# Patient Record
Sex: MALE | Race: WHITE | Employment: PART TIME | URBAN - METROPOLITAN AREA
[De-identification: names, ages, dates, MRNs, and addresses within clinical notes are randomized per-mention and may not be internally consistent; named-entity substitution may affect disease eponyms.]

---

## 2018-08-24 ENCOUNTER — OFFICE VISIT (OUTPATIENT)
Dept: URGENT CARE | Facility: URGENT CARE | Age: 18
End: 2018-08-24

## 2018-08-24 ENCOUNTER — RADIANT APPOINTMENT (OUTPATIENT)
Dept: GENERAL RADIOLOGY | Facility: CLINIC | Age: 18
End: 2018-08-24
Attending: FAMILY MEDICINE

## 2018-08-24 VITALS
TEMPERATURE: 98.5 F | WEIGHT: 280 LBS | DIASTOLIC BLOOD PRESSURE: 64 MMHG | OXYGEN SATURATION: 96 % | SYSTOLIC BLOOD PRESSURE: 122 MMHG | HEART RATE: 75 BPM

## 2018-08-24 DIAGNOSIS — S60.032A CONTUSION OF LEFT MIDDLE FINGER WITHOUT DAMAGE TO NAIL, INITIAL ENCOUNTER: Primary | ICD-10-CM

## 2018-08-24 DIAGNOSIS — M79.645 PAIN OF FINGER OF LEFT HAND: ICD-10-CM

## 2018-08-24 PROCEDURE — 73140 X-RAY EXAM OF FINGER(S): CPT | Mod: LT

## 2018-08-24 PROCEDURE — 99203 OFFICE O/P NEW LOW 30 MIN: CPT | Performed by: FAMILY MEDICINE

## 2018-08-24 RX ORDER — ALBUTEROL SULFATE 90 UG/1
2 AEROSOL, METERED RESPIRATORY (INHALATION)
COMMUNITY
Start: 2018-03-26

## 2018-08-24 RX ORDER — MONTELUKAST SODIUM 10 MG/1
10 TABLET ORAL
COMMUNITY
Start: 2018-03-26 | End: 2019-06-19

## 2018-08-24 NOTE — LETTER
[facility]  [address]  [address]  [facility] [address]  [phone]        2018    REPORT OF WORK ABILITY    PATIENT DATA  Employee Name: Murali Mcconnell        : 2000   Work related injury: YES  Today's date: 2018  Date of injury: 2018    PROVIDER EVALUATION: Please fill in as needed.  Please give copy to employee for employer.  1. Diagnosis: Left Third Finger Pain, Contusion of the Left Third Finger  2. Treatment: Wear a finger splint for 2 weeks.  Place ice onto the painful areas of the left middle finger for 10-15 minutes at a time, every 2-3 hours while awake.  Ibuprofen for the pain.    3. Medication: Rx:  Tylenol #3. Take one tablet by mouth every 6 hours as needed for severe pain.    NOTE: When ordering a medication, [place] Rules require Work Comp or WC on prescriptions.  4. Return to work date: Monday, 2018, with the following: No use of the left middle finger while at work for 10 days.   DURATION OF LIMITATIONS: from 2018, to 2018.       RESTRICTIONS: Unlimited unless listed.  Restrictions apply to home and leisure also.  If work within restrictions is not available, the employee is totally disabled.  Provider comments: follow up with the primary care provider if not better in 2 weeks.    Medical Examiner: Kamar Landers MD      License or registration: [place] [id number]    Next appointment: in 2 weeks as needed with the primary care provider if not better.     CC: Employer, Managed Care Plan/Payor, Patient

## 2018-08-24 NOTE — MR AVS SNAPSHOT
"              After Visit Summary   8/24/2018    Murali Mcconnell    MRN: [id number]           Patient Information     Date Of Birth          2000        Visit Information        Provider Department      8/24/2018 3:00 PM Kamar Landers MD [facility]        Today's Diagnoses     Contusion of left middle finger without damage to nail, initial encounter    -  1    Pain of finger of left hand          Care Instructions    Place ice onto the painful swollen areas of the left middle finger for 10-15 minutes at a time, every 2-3 hours while awake.      Ibuprofen for the pain.      Wear the finger splint for 2 weeks.     follow up with your primary care provider if not better in 2 weeks.      No use of the left middle finger for the next 10 days.                Follow-ups after your visit        Who to contact     If you have questions or need follow up information about today's clinic visit or your schedule please contact [facility] directly at [phone].  Normal or non-critical lab and imaging results will be communicated to you by [employer], letter or phone within 4 business days after the clinic has received the results. If you do not hear from us within 7 days, please contact the clinic through [employer] or phone. If you have a critical or abnormal lab result, we will notify you by phone as soon as possible.  Submit refill requests through [employer] or call your pharmacy and they will forward the refill request to us. Please allow 3 business days for your refill to be completed.          Additional Information About Your Visit        [employer] Information     [employer] lets you send messages to your doctor, view your test results, renew your prescriptions, schedule appointments and more. To sign up, go to [email or web address] . Click on \"Log in\" on the left side of the screen, which will take you to the Welcome page. Then click on \"Sign up Now\" on the right side of the page.     You will be " asked to enter the access code listed below, as well as some personal information. Please follow the directions to create your username and password.     Your access code is: [email or web address]  Expires: 2018  4:11 PM     Your access code will  in 90 days. If you need help or a new code, please call your [facility] clinic or [phone].        Care EveryWhere ID     This is your Care EveryWhere ID. This could be used by other organizations to access your [facility] medical records  [id number]        Your Vitals Were     Pulse Temperature Pulse Oximetry             75 98.5  F (36.9  C) (Tympanic) 96%          Blood Pressure from Last 3 Encounters:   18 122/64    Weight from Last 3 Encounters:   18 280 lb (127 kg)              We Performed the Following     FOAM FINGER SPLINT L     XR Finger Left G/E 2 Views          Today's Medication Changes          These changes are accurate as of 18  4:11 PM.  If you have any questions, ask your nurse or doctor.               Start taking these medicines.        Dose/Directions    acetaminophen-codeine 300-30 MG per tablet   Commonly known as:  TYLENOL #3   Used for:  Pain of finger of left hand   Started by:  Kamar Landers MD        Dose:  1 tablet   Take 1 tablet by mouth every 6 hours as needed for severe pain   Quantity:  12 tablet   Refills:  0            Where to get your medicines      Some of these will need a paper prescription and others can be bought over the counter.  Ask your nurse if you have questions.     Bring a paper prescription for each of these medications     acetaminophen-codeine 300-30 MG per tablet               Information about OPIOIDS     PRESCRIPTION OPIOIDS: WHAT YOU NEED TO KNOW   We gave you an opioid (narcotic) pain medicine. It is important to manage your pain, but opioids are not always the best choice. You should first try all the other options your care team gave you. Take this medicine for as short a time (and as few  doses) as possible.    Some activities can increase your pain, such as bandage changes or therapy sessions. It may help to take your pain medicine 30 to 60 minutes before these activities. Reduce your stress by getting enough sleep, working on hobbies you enjoy and practicing relaxation or meditation. Talk to your care team about ways to manage your pain beyond prescription opioids.    These medicines have risks:    DO NOT drive when on new or higher doses of pain medicine. These medicines can affect your alertness and reaction times, and you could be arrested for driving under the influence (DUI). If you need to use opioids long-term, talk to your care team about driving.    DO NOT operate heavy machinery    DO NOT do any other dangerous activities while taking these medicines.    DO NOT drink any alcohol while taking these medicines.     If the opioid prescribed includes acetaminophen, DO NOT take with any other medicines that contain acetaminophen. Read all labels carefully. Look for the word  acetaminophen  or  Tylenol.  Ask your pharmacist if you have questions or are unsure.    You can get addicted to pain medicines, especially if you have a history of addiction (chemical, alcohol or substance dependence). Talk to your care team about ways to reduce this risk.    All opioids tend to cause constipation. Drink plenty of water and eat foods that have a lot of fiber, such as fruits, vegetables, prune juice, apple juice and high-fiber cereal. Take a laxative (Miralax, milk of magnesia, Colace, Senna) if you don t move your bowels at least every other day. Other side effects include upset stomach, sleepiness, dizziness, throwing up, tolerance (needing more of the medicine to have the same effect), physical dependence and slowed breathing.    Store your pills in a secure place, locked if possible. We will not replace any lost or stolen medicine. If you don t finish your medicine, please throw away (dispose) as  directed by your pharmacist. The [place] Pollution Control Agency has more information about safe disposal: https://www.pca.[facility].[place].us/living-green/managing-unwanted-medications         Primary Care Provider Office Phone # Fax #    [facility] [phone] [address]  [place] [address]        Equal Access to Services     WALKER SALDANA : Hadii aad ku hadasho Soomaali, waaxda luqadaha, qaybta kaalmada adeegyada, waxay idiin hayaan adeeg lorenzoxiomyjuani nassar . So [facility] [phone].    ATENCIÓN: Si habla español, tiene a joya disposición servicios gratuitos de asistencia lingüística. Juan al [phone].    We comply with applicable federal civil rights laws and [place] laws. We do not discriminate on the basis of race, color, national origin, age, disability, sex, sexual orientation, or gender identity.            Thank you!     Thank you for choosing [facility]  for your care. Our goal is always to provide you with excellent care. Hearing back from our patients is one way we can continue to improve our services. Please take a few minutes to complete the written survey that you may receive in the mail after your visit with us. Thank you!             Your Updated Medication List - Protect others around you: Learn how to safely use, store and throw away your medicines at www.disposemymeds.org.          This list is accurate as of 8/24/18  4:11 PM.  Always use your most recent med list.                   Brand Name Dispense Instructions for use Diagnosis    acetaminophen-codeine 300-30 MG per tablet    TYLENOL #3    12 tablet    Take 1 tablet by mouth every 6 hours as needed for severe pain    Pain of finger of left hand       albuterol 108 (90 Base) MCG/ACT inhaler    PROAIR HFA/PROVENTIL HFA/VENTOLIN HFA     Inhale 2 puffs into the lungs        montelukast 10 MG tablet    SINGULAIR     Take 10 mg by mouth

## 2018-08-24 NOTE — PROGRESS NOTES
THIS IS A WORK COMP ENCOUNTER    SUBJECTIVE:  Chief Complaint   Patient presents with     Urgent Care     Work Comp     Middle finger injury from being caught at work x 10:30-10:45 yesterday night      Murali Mcconnell is a 18 year old right-handed male presents with a chief complaint of pain at the distal half of the left middle finger.     The injury occurred yesterday at around 10:30-10:45 pm  The injury happened while at work. 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  His left middle finger has produced two blood blisters, swelling, and bruising under the middle fingernail.  The patient complained of moderate-severe pain (pain ratin out of 10)  and has had decreased ROM.  Pain exacerbated by using the left third finger.  In addition, keeping the left finger below the level of the heart worsens the pain.  .  Relieved by elevating the left middle finger, by placing ice..  He treated it initially with ice.. This is the first time this type of injury has occurred to this patient.     Past Medical History:    Asthma      Current Outpatient Prescriptions   Medication Sig Dispense Refill     albuterol (PROAIR HFA/PROVENTIL HFA/VENTOLIN HFA) 108 (90 Base) MCG/ACT inhaler Inhale 2 puffs into the lungs       montelukast (SINGULAIR) 10 MG tablet Take 10 mg by mouth       Social History   Substance Use Topics     Smoking status: Not on file     Smokeless tobacco: Not on file     Alcohol use Not on file       ROS:  Review of systems negative except as stated above.    EXAM:   /64 (BP Location: Right arm, Patient Position: Chair, Cuff Size: Adult Large)  Pulse 75  Temp 98.5  F (36.9  C) (Tympanic)  Wt 280 lb (127 kg)  SpO2 96%  Gen: healthy,alert,no distress  Extremity: Left third finger has a subungual hematoma present (mild) and two small isolated areas of ecchymosis on the volar surface of the distal phalanx area.   The fingernail is intact.  No avulsions of the nail.    There is swelling and a lot of pain with palpation over the distal phalanx region.  .   There is not compromise to the distal circulation. No obvious lacerations/abrasions.    NEURO: Normal strength and tone, sensory exam grossly normal, mentation intact and speech normal    X-RAY was done.  X-rays of the left third finger:  No acute fractures nor dislocations.     ASSESSMENT:   Pain at the left third finger  Contusion of the left third finger.     PLAN:  1) Rest, Ice, elevation  2) A finger splint was placed onto the left middle finger.  Wear this splint for 2 weeks.   3) Rx:  Tylenol #3  4) follow up with the primary care provider if not better in 2 weeks.   5) No use of the left middle finger for 10 days while at work.     Kamar Landers MD

## 2018-08-24 NOTE — PATIENT INSTRUCTIONS
Place ice onto the painful swollen areas of the left middle finger for 10-15 minutes at a time, every 2-3 hours while awake.      Ibuprofen for the pain.      Wear the finger splint for 2 weeks.     follow up with your primary care provider if not better in 2 weeks.      No use of the left middle finger for the next 10 days.

## 2019-06-19 ENCOUNTER — APPOINTMENT (OUTPATIENT)
Dept: GENERAL RADIOLOGY | Facility: CLINIC | Age: 19
End: 2019-06-19
Attending: EMERGENCY MEDICINE
Payer: COMMERCIAL

## 2019-06-19 ENCOUNTER — HOSPITAL ENCOUNTER (EMERGENCY)
Facility: CLINIC | Age: 19
Discharge: HOME | End: 2019-06-19
Attending: EMERGENCY MEDICINE | Admitting: EMERGENCY MEDICINE
Payer: COMMERCIAL

## 2019-06-19 VITALS
SYSTOLIC BLOOD PRESSURE: 161 MMHG | DIASTOLIC BLOOD PRESSURE: 90 MMHG | RESPIRATION RATE: 16 BRPM | OXYGEN SATURATION: 100 %
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
## 2019-06-19 DIAGNOSIS — S40.012A CONTUSION OF LEFT SHOULDER, INITIAL ENCOUNTER: ICD-10-CM

## 2019-06-19 DIAGNOSIS — S16.1XXA STRAIN OF NECK MUSCLE, INITIAL ENCOUNTER: ICD-10-CM

## 2019-06-19 PROCEDURE — 73030 X-RAY EXAM OF SHOULDER: CPT | Mod: LT

## 2019-06-19 PROCEDURE — 99284 EMERGENCY DEPT VISIT MOD MDM: CPT | Mod: 25

## 2019-06-19 PROCEDURE — 25000132 ZZH RX MED GY IP 250 OP 250 PS 637: Performed by: EMERGENCY MEDICINE

## 2019-06-19 PROCEDURE — 71046 X-RAY EXAM CHEST 2 VIEWS: CPT

## 2019-06-19 RX ORDER — IBUPROFEN 600 MG/1
600 TABLET, FILM COATED ORAL ONCE
Status: COMPLETED | OUTPATIENT
Start: 2019-06-19 | End: 2019-06-19

## 2019-06-19 RX ORDER — METHOCARBAMOL 750 MG/1
750 TABLET, FILM COATED ORAL ONCE
Status: COMPLETED | OUTPATIENT
Start: 2019-06-19 | End: 2019-06-19

## 2019-06-19 RX ORDER — METHOCARBAMOL 750 MG/1
750 TABLET, FILM COATED ORAL 3 TIMES DAILY PRN
Qty: 15 TABLET | Refills: 0 | Status: SHIPPED | OUTPATIENT
Start: 2019-06-19 | End: 2019-06-24

## 2019-06-19 RX ORDER — IBUPROFEN 600 MG/1
600 TABLET, FILM COATED ORAL EVERY 6 HOURS PRN
Qty: 28 TABLET | Refills: 0 | Status: SHIPPED | OUTPATIENT
Start: 2019-06-19 | End: 2019-06-26

## 2019-06-19 RX ADMIN — IBUPROFEN 600 MG: 600 TABLET ORAL at 08:21

## 2019-06-19 RX ADMIN — METHOCARBAMOL 750 MG: 750 TABLET ORAL at 08:21

## 2019-06-19 ASSESSMENT — ENCOUNTER SYMPTOMS
NECK PAIN: 1
ARTHRALGIAS: 1
NAUSEA: 0
SHORTNESS OF BREATH: 0

## 2019-06-19 NOTE — ED AVS SNAPSHOT
[facility]  [address]  [facility] [address]  Phone:  [phone]  Fax:  [phone]                                    Murali Mcconnell   MRN: [id number]    Department:  [facility]   Date of Visit:  6/19/2019           After Visit Summary Signature Page    I have received my discharge instructions, and my questions have been answered. I have discussed any challenges I see with this plan with the nurse or doctor.    ..........................................................................................................................................  Patient/Patient Representative Signature      ..........................................................................................................................................  Patient Representative Print Name and Relationship to Patient    ..................................................               ................................................  Date                                   Time    ..........................................................................................................................................  Reviewed by Signature/Title    ...................................................              ..............................................  Date                                               Time          22EPIC Rev 08/18

## 2019-06-19 NOTE — LETTER
June 19, 2019      To Whom It May Concern:      Murali Mcconnell was seen in our Emergency Department today, 06/19/19.  I expect his condition to improve over the next 2-3 days.  He may return to work/school when improved.    Sincerely,    Raiza Torres RN         In complete remission.
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
## 2019-06-19 NOTE — LETTER
June 19, 2019      To Whom It May Concern:      Murali Mcconnell was seen in our Emergency Department today, 06/19/19.  I expect his condition to improve over the next 1-2 days.  He may return to work at that time.  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  The patient should contact his primary care provider for further work releases.    Sincerely,            Domo Rojas, DO
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
